# Patient Record
Sex: FEMALE | Race: WHITE | NOT HISPANIC OR LATINO | URBAN - METROPOLITAN AREA
[De-identification: names, ages, dates, MRNs, and addresses within clinical notes are randomized per-mention and may not be internally consistent; named-entity substitution may affect disease eponyms.]

---

## 2021-08-20 ENCOUNTER — APPOINTMENT (RX ONLY)
Dept: URBAN - METROPOLITAN AREA CLINIC 100 | Facility: CLINIC | Age: 41
Setting detail: DERMATOLOGY
End: 2021-08-20

## 2021-08-20 DIAGNOSIS — L71.0 PERIORAL DERMATITIS: ICD-10-CM

## 2021-08-20 DIAGNOSIS — Z41.9 ENCOUNTER FOR PROCEDURE FOR PURPOSES OTHER THAN REMEDYING HEALTH STATE, UNSPECIFIED: ICD-10-CM

## 2021-08-20 PROCEDURE — 99203 OFFICE O/P NEW LOW 30 MIN: CPT

## 2021-08-20 PROCEDURE — ? PRESCRIPTION

## 2021-08-20 PROCEDURE — ? COSMETIC CONSULTATION: BOTULINUM TOXIN

## 2021-08-20 PROCEDURE — ? COUNSELING

## 2021-08-20 PROCEDURE — ? RECOMMENDATIONS

## 2021-08-20 PROCEDURE — ? TREATMENT REGIMEN

## 2021-08-20 RX ORDER — DOXYCYCLINE HYCLATE 100 MG/1
CAPSULE, GELATIN COATED ORAL
Qty: 30 | Refills: 1 | Status: ERX | COMMUNITY
Start: 2021-08-20

## 2021-08-20 RX ADMIN — DOXYCYCLINE HYCLATE: 100 CAPSULE, GELATIN COATED ORAL at 00:00

## 2021-08-20 ASSESSMENT — LOCATION SIMPLE DESCRIPTION DERM
LOCATION SIMPLE: INFERIOR FOREHEAD
LOCATION SIMPLE: LEFT CHEEK
LOCATION SIMPLE: RIGHT CHEEK

## 2021-08-20 ASSESSMENT — LOCATION DETAILED DESCRIPTION DERM
LOCATION DETAILED: INFERIOR MID FOREHEAD
LOCATION DETAILED: RIGHT INFERIOR MEDIAL MALAR CHEEK
LOCATION DETAILED: LEFT INFERIOR MEDIAL MALAR CHEEK

## 2021-08-20 ASSESSMENT — LOCATION ZONE DERM: LOCATION ZONE: FACE

## 2021-08-20 NOTE — PROCEDURE: RECOMMENDATIONS
Render Risk Assessment In Note?: no
Recommendations (Free Text): Spoke about 26 units- will stay on the conservative side, patient had lids drop after getting her forehead done.
Detail Level: Detailed

## 2021-08-20 NOTE — PROCEDURE: TREATMENT REGIMEN
Otc Regimen: Wash face with panoxyl twice daily. Make sure toothpaste has no cinnamon
Plan: Patient has topical erythromycin at home from previous doctor. Use it bid for 2 weeks
Detail Level: Zone

## 2021-12-15 ENCOUNTER — APPOINTMENT (RX ONLY)
Dept: URBAN - METROPOLITAN AREA CLINIC 100 | Facility: CLINIC | Age: 41
Setting detail: DERMATOLOGY
End: 2021-12-15

## 2021-12-15 DIAGNOSIS — Z41.9 ENCOUNTER FOR PROCEDURE FOR PURPOSES OTHER THAN REMEDYING HEALTH STATE, UNSPECIFIED: ICD-10-CM

## 2021-12-15 PROCEDURE — ? BOTOX (U OR CC)

## 2021-12-15 ASSESSMENT — LOCATION SIMPLE DESCRIPTION DERM
LOCATION SIMPLE: GLABELLA
LOCATION SIMPLE: LEFT FOREHEAD
LOCATION SIMPLE: RIGHT FOREHEAD
LOCATION SIMPLE: LEFT EYEBROW
LOCATION SIMPLE: RIGHT EYEBROW

## 2021-12-15 ASSESSMENT — LOCATION DETAILED DESCRIPTION DERM
LOCATION DETAILED: GLABELLA
LOCATION DETAILED: LEFT MEDIAL FOREHEAD
LOCATION DETAILED: LEFT FOREHEAD
LOCATION DETAILED: LEFT LATERAL EYEBROW
LOCATION DETAILED: RIGHT MEDIAL EYEBROW
LOCATION DETAILED: RIGHT SUPERIOR FOREHEAD
LOCATION DETAILED: RIGHT MEDIAL FOREHEAD
LOCATION DETAILED: RIGHT LATERAL EYEBROW
LOCATION DETAILED: LEFT INFERIOR MEDIAL FOREHEAD
LOCATION DETAILED: RIGHT INFERIOR FOREHEAD

## 2021-12-15 ASSESSMENT — LOCATION ZONE DERM: LOCATION ZONE: FACE

## 2021-12-15 NOTE — PROCEDURE: BOTOX (U OR CC)
Price (Use Numbers Only, No Special Characters Or $): 932 Price (Use Numbers Only, No Special Characters Or $): 043

## 2022-05-04 ENCOUNTER — APPOINTMENT (RX ONLY)
Dept: URBAN - METROPOLITAN AREA CLINIC 100 | Facility: CLINIC | Age: 42
Setting detail: DERMATOLOGY
End: 2022-05-04

## 2022-05-04 DIAGNOSIS — Z41.9 ENCOUNTER FOR PROCEDURE FOR PURPOSES OTHER THAN REMEDYING HEALTH STATE, UNSPECIFIED: ICD-10-CM

## 2022-05-04 PROCEDURE — ? OTHER

## 2022-05-04 PROCEDURE — ? BOTOX (U OR CC)

## 2022-05-04 ASSESSMENT — LOCATION DETAILED DESCRIPTION DERM
LOCATION DETAILED: RIGHT INFERIOR MEDIAL FOREHEAD
LOCATION DETAILED: LEFT INFERIOR MEDIAL FOREHEAD
LOCATION DETAILED: LEFT LATERAL CANTHUS
LOCATION DETAILED: LEFT SUPERIOR MEDIAL FOREHEAD
LOCATION DETAILED: RIGHT SUPERIOR MEDIAL FOREHEAD
LOCATION DETAILED: LEFT SUPERIOR FOREHEAD
LOCATION DETAILED: RIGHT SUPERIOR FOREHEAD
LOCATION DETAILED: RIGHT SUPERIOR LATERAL MALAR CHEEK
LOCATION DETAILED: RIGHT INFERIOR TEMPLE
LOCATION DETAILED: LEFT MID TEMPLE
LOCATION DETAILED: GLABELLA

## 2022-05-04 ASSESSMENT — LOCATION SIMPLE DESCRIPTION DERM
LOCATION SIMPLE: RIGHT CHEEK
LOCATION SIMPLE: RIGHT TEMPLE
LOCATION SIMPLE: LEFT TEMPLE
LOCATION SIMPLE: RIGHT FOREHEAD
LOCATION SIMPLE: LEFT EYELID
LOCATION SIMPLE: GLABELLA
LOCATION SIMPLE: LEFT FOREHEAD

## 2022-05-04 ASSESSMENT — LOCATION ZONE DERM
LOCATION ZONE: EYELID
LOCATION ZONE: FACE

## 2022-05-04 NOTE — PROCEDURE: OTHER
Other (Free Text): Patient received 10 units for free for [employer] promotion.
Note Text (......Xxx Chief Complaint.): This diagnosis correlates with the
Render Risk Assessment In Note?: no
Detail Level: Zone
No

## 2022-05-04 NOTE — PROCEDURE: BOTOX (U OR CC)
Price (Use Numbers Only, No Special Characters Or $): 237 Price (Use Numbers Only, No Special Characters Or $): 858

## 2023-10-11 ENCOUNTER — APPOINTMENT (RX ONLY)
Dept: URBAN - METROPOLITAN AREA CLINIC 102 | Facility: CLINIC | Age: 43
Setting detail: DERMATOLOGY
End: 2023-10-11

## 2023-10-11 DIAGNOSIS — Z41.9 ENCOUNTER FOR PROCEDURE FOR PURPOSES OTHER THAN REMEDYING HEALTH STATE, UNSPECIFIED: ICD-10-CM

## 2023-10-11 PROCEDURE — ? ADDITIONAL NOTES

## 2023-10-11 NOTE — PROCEDURE: ADDITIONAL NOTES
Detail Level: Simple
Render Risk Assessment In Note?: no
Additional Notes: Buy 5 vials of sculptra at 20% off and get 1 vial for free. Patient needs to think about treatment before willing to purchase. She will call back in am to purchase